# Patient Record
Sex: FEMALE | Race: WHITE
[De-identification: names, ages, dates, MRNs, and addresses within clinical notes are randomized per-mention and may not be internally consistent; named-entity substitution may affect disease eponyms.]

---

## 2021-08-16 ENCOUNTER — HOSPITAL ENCOUNTER (OUTPATIENT)
Dept: HOSPITAL 96 - M.ULTRA | Age: 81
End: 2021-08-16
Attending: PHYSICIAN ASSISTANT
Payer: COMMERCIAL

## 2021-08-16 DIAGNOSIS — R05: ICD-10-CM

## 2021-08-16 DIAGNOSIS — R22.43: Primary | ICD-10-CM

## 2021-08-16 DIAGNOSIS — R06.02: ICD-10-CM

## 2021-10-18 ENCOUNTER — HOSPITAL ENCOUNTER (OUTPATIENT)
Dept: HOSPITAL 96 - M.CRD | Age: 81
End: 2021-10-18
Attending: FAMILY MEDICINE
Payer: COMMERCIAL

## 2021-10-18 DIAGNOSIS — I08.8: Primary | ICD-10-CM

## 2021-10-18 DIAGNOSIS — R60.0: ICD-10-CM

## 2021-10-18 NOTE — 2DMMODE
[place] [address]
Phone:  [phone] 2 D/M-MODE ECHOCARDIOGRAM     
_______________________________________________________________________________
 
Name:         ODALIS DIAZ JO                Room:                     [facility]#:    [id number]     Account #:     [id number]  
Admission:    10/18/21    Attend Phys:   Sylvain Ricks
Discharge:                Date of Birth: [birth year]  
Date of Service: 10/18/21 1654  Report #:      [id number]
        [id number]
_______________________________________________________________________________
THIS REPORT FOR:
 
cc:  Sylvain Ricks,Nathan Ye MD [facility]     
                                                                       ~
 
--------------- APPROVED REPORT --------------
 
 
Study performed:  10/18/2021 10:56:40
 
EXAM: Comprehensive 2D, Doppler, and color-flow 
Echocardiogram 
Patient Location: Out-Patient   
 
      BSA:         1.96
HR: 67 bpm BP:          120/80 mmHg 
 
Other Information 
Study Quality: Good
 
Indications
Peripheral Edema
 
2D Dimensions
IVSd:  11.07 (7-11mm) LVOT Diam:  20.03 (18-24mm) 
LVDd:  47.26 mm  
PWd:  10.57 (7-11mm) Ascending Ao:  34.28 (22-36mm)
LVDs:  29.38 (25-40mm) 
Aortic Root:  29.34 mm 
 
Volumes
Left Atrial Volume (Systole) 
    LA ESV Index:  21.70 mL/m2
 
Aortic Valve
AoV Peak Pravin.:  1.05 m/s 
AO Peak Gr.:  4.42 mmHg  LVOT Max PG:  3.85 mmHg
AO Mean Gr.:  2.50 mmHg  LVOT Mean P.73 mmHg
    LVOT Max V:  0.98 m/s
AO V2 VTI:  26.55 cm  LVOT Mean V:  0.59 m/s
DILLON (VTI):  3.07 cm2  LVOT V1 VTI:  25.89 cm
 
Mitral Valve
    E/A Ratio:  1.19
 
 
[place] [address]
Phone:  [phone]                     2 D/M-MODE ECHOCARDIOGRAM     
_______________________________________________________________________________
 
Name:         ODALIS DIAZ                Room:                     [facility]
LU[facility]#:    [id number]     Account #:     [id number]  
Admission:    10/18/21    Attend Phys:   Sylvain Ricks
Discharge:                Date of Birth: [birth year]  
Date of Service: 10/18/21 1654  Report #:      [id number]
        [id number]
_______________________________________________________________________________
    MV Decel. Time:  182.33 ms
MV E Max Pravin.:  0.80 m/s 
MV PHT:  52.88 ms  
MVA (PHT):  4.16 cm2  
 
TDI
E/Lateral E':  7.27 E/Medial E':  10.00
   Medial E' Pravin.:  0.08 m/s
   Lateral E' Pravin.:  0.11 m/s
 
Pulmonary Valve
PV Peak Pravin.:  0.72 m/s PV Peak Gr.:  2.10 mmHg
 
Tricuspid Valve
    RAP Estimate:  5.00 mmHg
TR Peak Gr.:  24.38 mmHg RVSP:  29.38 mmHg
    PA Pressure:  29.38 mmHg
 
Left Ventricle
The left ventricle is normal size. There is normal LV segmental wall 
motion. There is normal left ventricular wall thickness. Left 
ventricular systolic function is normal. LVEF is 60-65%. Transmitral 
Doppler flow pattern suggests impaired LV relaxation.
 
Right Ventricle
The right ventricle is normal size. The right ventricular systolic 
function is normal.
 
Atria
The left atrium size is normal. The right atrium size is 
normal.
 
Aortic Valve
The aortic valve is normal in structure. No aortic regurgitation is 
present. There is no aortic valvular stenosis.
 
Mitral Valve
The mitral valve is normal in structure. Trace to mild mitral 
regurgitation. No evidence of mitral valve stenosis.
 
Tricuspid Valve
The tricuspid valve is normal in structure. Mild tricuspid 
regurgitation. The RVSP is 30-35 mmHg.
 
Pulmonic Valve
The pulmonary valve is normal in structure. Mild pulmonic 
 
 
[place] [address]
Phone:  [phone]                     2 D/M-MODE ECHOCARDIOGRAM     
_______________________________________________________________________________
 
Name:         ODALIS DIAZ JO                Room:                     [facility]#:    [id number]     Account #:     [id number]  
Admission:    10/18/21    Attend Phys:   Sylvain Ricks
Discharge:                Date of Birth: [birth year]  
Date of Service: 10/18/21 1654  Report #:      [id number]
        [id number]
_______________________________________________________________________________
regurgitation.
 
Great Vessels
The aortic root is normal in size. IVC is normal in size and 
collapses >50% with inspiration.
 
Pericardium
There is no pericardial effusion.
 
<Conclusion>
The left ventricle is normal size.
There is normal left ventricular wall thickness.
Left ventricular systolic function is normal.
LVEF is 60-65%.
Transmitral Doppler flow pattern suggests impaired LV 
relaxation.
There is normal LV segmental wall motion.
Trace to mild mitral regurgitation.
Mild tricuspid regurgitation.
The RVSP is 30-35 mmHg.
 
 
 
 
 
 
 
 
 
 
 
 
 
 
 
 
 
 
 
 
 
 
 
 
  <ELECTRONICALLY SIGNED>
                                           By: Nathan Rodríguez MD, FACC   
  10/18/21     1654
D: 10/18/21 1654   _____________________________________
T: 10/18/21 1654   Nathan Rodríguez MD, FACC     /INF